# Patient Record
Sex: FEMALE
[De-identification: names, ages, dates, MRNs, and addresses within clinical notes are randomized per-mention and may not be internally consistent; named-entity substitution may affect disease eponyms.]

---

## 2021-11-26 ENCOUNTER — HOSPITAL ENCOUNTER (EMERGENCY)
Dept: HOSPITAL 41 - JD.ED | Age: 51
LOS: 1 days | Discharge: HOME | End: 2021-11-27
Payer: MEDICARE

## 2021-11-26 DIAGNOSIS — Z88.5: ICD-10-CM

## 2021-11-26 DIAGNOSIS — Z88.8: ICD-10-CM

## 2021-11-26 DIAGNOSIS — N83.202: ICD-10-CM

## 2021-11-26 DIAGNOSIS — N83.201: Primary | ICD-10-CM

## 2021-11-26 DIAGNOSIS — R94.31: ICD-10-CM

## 2021-11-26 DIAGNOSIS — Z91.012: ICD-10-CM

## 2021-11-26 NOTE — EDM.PDOC
ED HPI GENERAL MEDICAL PROBLEM





- General


Chief Complaint: Abdominal Pain


Stated Complaint: R SIDE ABDOMINAL PAIN


Time Seen by Provider: 11/26/21 22:13





- History of Present Illness


INITIAL COMMENTS - FREE TEXT/NARRATIVE: 





51-year-old female presents the emergency room with abdominal pain.





Patient has had several days a worsening abdominal pain.  The pain seems to be 

in the right side of her abdomen.  She is quite nauseated minimal vomiting she 

said diarrhea with this.  Yesterday she attempted to go to [place] to have 

Thanksgiving with the family.  Heading down there they stopped and emergency 

room she was tested for Covid found to be negative her evaluation of the 

emergency room was unrevealing I am not entirely sure exactly how big of the 

work-up she had done.  Pain is mostly on the right side.  She had a history of a

cholecystectomy she still has her appendix.  She denies any blood in her stool 

but has had loose stools.


  ** Right Abdomen


Pain Score (Numeric/FACES): 10





- Related Data


                                    Allergies











Allergy/AdvReac Type Severity Reaction Status Date / Time


 


caffeine Allergy Severe Nausea and Verified 11/26/21 22:03





   Vomiting  


 


codeine Allergy Severe Hives Verified 11/26/21 22:03


 


egg Allergy Severe Hives Verified 11/26/21 22:03


 


morphine Allergy Severe Hives Verified 11/26/21 22:03











Home Meds: 


                                    Home Meds





ARIPiprazole [Aripiprazole] 0 mg PO DAILY 11/26/21 [History]


FLUoxetine HCl [Prozac] 40 mg PO DAILY 11/26/21 [History]


Omeprazole 20 mg PO DAILY 11/26/21 [History]


Pregabalin [Lyrica] 0 mg PO DAILY 11/26/21 [History]











ED ROS GENERAL





- Review of Systems


Review Of Systems: See Below


Constitutional: Reports: No Symptoms


HEENT: Reports: No Symptoms


Respiratory: Reports: No Symptoms


Cardiovascular: Reports: No Symptoms


GI/Abdominal: Reports: Abdominal Pain, Anorexia, Diarrhea, Decreased Appetite, 

Nausea, Vomiting.  Denies: Black Stool, Bloody Stool


: Reports: No Symptoms


Musculoskeletal: Reports: No Symptoms


Skin: Reports: No Symptoms


Neurological: Reports: No Symptoms


Psychiatric: Reports: No Symptoms





ED EXAM, GENERAL





- Physical Exam


Exam: See Below


Exam Limited By: No Limitations


General Appearance: Alert, Moderate Distress (From anxiety and pain)


Head: Atraumatic, Normocephalic


Neck: Normal Inspection, Supple, Non-Tender, Full Range of Motion


Respiratory/Chest: No Respiratory Distress, Lungs Clear, Normal Breath Sounds


Cardiovascular: Regular Rate, Rhythm, No Edema, No Murmur


GI/Abdominal: Normal Bowel Sounds, Soft, Tender (Heart tenderness on the right 

side she has rebound tenderness on the left radiating to the right.)


Back Exam: Normal Inspection, CVA Tenderness (L), CVA Tenderness (R)


  ** #1 Interpretation


EKG Date: 11/26/21


Rhythm: Other (Sinus arrhythmia)


Axis: Normal


P-Wave: Present


QRS: Normal


ST-T: Other (Lateral lead ST depression nondiagnostic)


QT: Normal


Comparison: NA - No Prior EKG


EKG Interpretation Comments: 





Abnormal EKG





Course





- Vital Signs


Last Recorded V/S: 


                                Last Vital Signs











Temp  36.4 C   11/26/21 21:56


 


Pulse  52 L  11/26/21 21:56


 


Resp  20   11/26/21 21:56


 


BP  155/79 H  11/26/21 21:56


 


Pulse Ox  100   11/26/21 21:56














- Orders/Labs/Meds


Orders: 


                               Active Orders 24 hr











 Category Date Time Status


 


 Abdomen Pelvis w Cont [CT] Stat Exams  11/26/21 22:23 Taken


 


 Lactated Ringers [Ringers, Lactated] 1,000 ml Med  11/26/21 22:30 Active





 IV ASDIRECTED   








                                Medication Orders





Lactated Ringer's (Ringers, Lactated)  1,000 mls @ 125 mls/hr IV ASDIRECTED LEXI


   Last Admin: 11/26/21 23:47  Dose: 125 mls/hr


   Documented by: HEIDI








Labs: 


                                Laboratory Tests











  11/26/21 11/26/21 11/26/21 Range/Units





  22:37 22:37 22:40 


 


WBC  9.54    (3.98-10.04)  K/mm3


 


RBC  4.42    (3.98-5.22)  M/mm3


 


Hgb  13.2    (11.2-15.7)  gm/dl


 


Hct  40.5    (34.1-44.9)  %


 


MCV  91.6    (79.4-94.8)  fl


 


MCH  29.9    (25.6-32.2)  pg


 


MCHC  32.6    (32.2-35.5)  g/dl


 


RDW Std Deviation  46.1    (36.4-46.3)  fL


 


Plt Count  126 L    (182-369)  K/mm3


 


MPV  10.9    (9.4-12.3)  fl


 


Neutrophils % (Manual)  88 H    (40-60)  %


 


Band Neutrophils %  0    (0-10)  %


 


Lymphocytes % (Manual)  11 L    (20-40)  %


 


Atypical Lymphs %  0    %


 


Monocytes % (Manual)  1 L    (2-10)  %


 


Eosinophils % (Manual)  0 L    (0.7-5.8)  %


 


Basophils % (Manual)  0 L    (0.1-1.2)  


 


Platelet Estimate  Decreased    


 


Plt Morphology Comment  See note    


 


RBC Morph Comment  Normal    


 


Sodium   141   (136-145)  mEq/L


 


Potassium   3.4 L   (3.5-5.1)  mEq/L


 


Chloride   104   ()  mEq/L


 


Carbon Dioxide   24   (21-32)  mEq/L


 


Anion Gap   16.4 H   (5-15)  


 


BUN   11   (7-18)  mg/dL


 


Creatinine   1.0   (0.55-1.02)  mg/dL


 


Est Cr Clr Drug Dosing   53.23   mL/min


 


Estimated GFR (MDRD)   59   (>60)  mL/min


 


BUN/Creatinine Ratio   11.0 L   (14-18)  


 


Glucose   141 H   (70-99)  mg/dL


 


Calcium   9.6   (8.5-10.1)  mg/dL


 


Total Bilirubin   0.4   (0.2-1.0)  mg/dL


 


AST   20   (15-37)  U/L


 


ALT   29   (14-59)  U/L


 


Alkaline Phosphatase   88   ()  U/L


 


Total Protein   7.6   (6.4-8.2)  g/dl


 


Albumin   4.0   (3.4-5.0)  g/dl


 


Globulin   3.6   gm/dL


 


Albumin/Globulin Ratio   1.1   (1-2)  


 


Lipase   111   ()  U/L


 


Urine Color    Yellow  (Yellow)  


 


Urine Appearance    Clear  (Clear)  


 


Urine pH    >=9.0 H  (5.0-8.0)  


 


Ur Specific Gravity    1.020  (1.005-1.030)  


 


Urine Protein    Negative  (Negative)  


 


Urine Glucose (UA)    Negative  (Negative)  


 


Urine Ketones    2+ H  (Negative)  


 


Urine Occult Blood    Negative  (Negative)  


 


Urine Nitrite    Negative  (Negative)  


 


Urine Bilirubin    Negative  (Negative)  


 


Urine Urobilinogen    0.2  (0.2-1.0)  


 


Ur Leukocyte Esterase    Negative  (Negative)  


 


Urine HCG, Qual     (NEGATIVE)  














  11/26/21 Range/Units





  22:40 


 


WBC   (3.98-10.04)  K/mm3


 


RBC   (3.98-5.22)  M/mm3


 


Hgb   (11.2-15.7)  gm/dl


 


Hct   (34.1-44.9)  %


 


MCV   (79.4-94.8)  fl


 


MCH   (25.6-32.2)  pg


 


MCHC   (32.2-35.5)  g/dl


 


RDW Std Deviation   (36.4-46.3)  fL


 


Plt Count   (182-369)  K/mm3


 


MPV   (9.4-12.3)  fl


 


Neutrophils % (Manual)   (40-60)  %


 


Band Neutrophils %   (0-10)  %


 


Lymphocytes % (Manual)   (20-40)  %


 


Atypical Lymphs %   %


 


Monocytes % (Manual)   (2-10)  %


 


Eosinophils % (Manual)   (0.7-5.8)  %


 


Basophils % (Manual)   (0.1-1.2)  


 


Platelet Estimate   


 


Plt Morphology Comment   


 


RBC Morph Comment   


 


Sodium   (136-145)  mEq/L


 


Potassium   (3.5-5.1)  mEq/L


 


Chloride   ()  mEq/L


 


Carbon Dioxide   (21-32)  mEq/L


 


Anion Gap   (5-15)  


 


BUN   (7-18)  mg/dL


 


Creatinine   (0.55-1.02)  mg/dL


 


Est Cr Clr Drug Dosing   mL/min


 


Estimated GFR (MDRD)   (>60)  mL/min


 


BUN/Creatinine Ratio   (14-18)  


 


Glucose   (70-99)  mg/dL


 


Calcium   (8.5-10.1)  mg/dL


 


Total Bilirubin   (0.2-1.0)  mg/dL


 


AST   (15-37)  U/L


 


ALT   (14-59)  U/L


 


Alkaline Phosphatase   ()  U/L


 


Total Protein   (6.4-8.2)  g/dl


 


Albumin   (3.4-5.0)  g/dl


 


Globulin   gm/dL


 


Albumin/Globulin Ratio   (1-2)  


 


Lipase   ()  U/L


 


Urine Color   (Yellow)  


 


Urine Appearance   (Clear)  


 


Urine pH   (5.0-8.0)  


 


Ur Specific Gravity   (1.005-1.030)  


 


Urine Protein   (Negative)  


 


Urine Glucose (UA)   (Negative)  


 


Urine Ketones   (Negative)  


 


Urine Occult Blood   (Negative)  


 


Urine Nitrite   (Negative)  


 


Urine Bilirubin   (Negative)  


 


Urine Urobilinogen   (0.2-1.0)  


 


Ur Leukocyte Esterase   (Negative)  


 


Urine HCG, Qual  Negative  (NEGATIVE)  











Meds: 


Medications











Generic Name Dose Route Start Last Admin





  Trade Name Antonio  PRN Reason Stop Dose Admin


 


Lactated Ringer's  1,000 mls @ 125 mls/hr  11/26/21 22:30  11/26/21 23:47





  Ringers, Lactated  IV   125 mls/hr





  ASDIRECTED LEXI   Administration














Discontinued Medications














Generic Name Dose Route Start Last Admin





  Trade Name Antonio  PRN Reason Stop Dose Admin


 


Diatrizoate Meglum/Diatrizoate Sod  120 ml  11/26/21 23:08  11/27/21 00:09





  Diatrizoate Meglumine/Diatrizoate Sodium 37% 120 Ml Bottle  PO  11/26/21 23:09

 120 ml





  ONETIME ONE   Administration


 


Hydromorphone HCl  0.5 mg  11/26/21 22:21  11/26/21 22:43





  Hydromorphone 0.5 Mg/0.5 Ml Syringe  IVPUSH  11/26/21 22:22  0.5 mg





  ONETIME ONE   Administration


 


Lactated Ringer's  1,000 mls @ 999 mls/hr  11/26/21 22:21  11/26/21 22:43





  Ringers, Lactated  IV  11/26/21 23:21  999 mls/hr





  .BOLUS ONE   Administration


 


Iopamidol  100 ml  11/26/21 23:07  11/27/21 00:09





  Iopamidol 612 Mg/Ml 100 Ml Bottle  IVPUSH  11/26/21 23:08  100 ml





  ONETIME ONE   Administration


 


Ondansetron HCl  4 mg  11/26/21 22:21  11/26/21 22:43





  Ondansetron 4 Mg/2 Ml Sdv  IVPUSH  11/26/21 22:22  4 mg





  ONETIME ONE   Administration


 


Ondansetron HCl  4 mg  11/26/21 23:30  11/26/21 23:38





  Ondansetron 4 Mg/2 Ml Sdv  IVPUSH  11/26/21 23:31  4 mg





  ONETIME ONE   Administration


 


Potassium Chloride  40 meq  11/27/21 02:22 





  Potassium Chloride 20 Meq Tab.Er  PO  11/27/21 02:23 





  ONETIME ONE  


 


Sodium Chloride  10 ml  11/26/21 23:07  11/27/21 00:09





  Sodium Chloride 0.9% 10 Ml Sdv  FLUSH  11/26/21 23:08  10 ml





  ONETIME ONE   Administration














- Re-Assessments/Exams


Free Text/Narrative Re-Assessment/Exam: 





11/27/21 02:32


Laboratory evaluation trying to work-up this abdominal pain is fairly 

nonspecific her potassium is a little low at 3.4 give her 40 mEq of p.o. 

potassium.  Went ahead and ordered a abdomen pelvis CT this was nondiagnostic 

however it was noted that she has bilateral ovarian cysts was recommended that 

these get further evaluated to better understand the etiology via ultrasound as 

an outpatient.





I discussed the above findings with the patient and she agrees to go home and 

will follow up with her OB and her regular healthcare provider in [place].





I will discharge her with some Norco from the machine out in the waiting room 

#20 1 or 2 every 6 hours as needed and Zofran 4 mg 1 every 6 hours #10











Departure





- Departure


Time of Disposition: 02:36


Disposition: Home, Self-Care 01


Clinical Impression: 


 Abdominal pain of unknown cause, Bilateral ovarian cysts








- Discharge Information


Referrals: 


Rosanna Andre, NP [Primary Care Provider] - 


Forms:  ED Department Discharge


Additional Instructions: 


Return to the emergency room with any questions problems or worsening symptoms.





Return in 24 hours if not improving.  Return sooner if getting worse.





On your CAT scan evaluation you have ovarian cyst on both sides it is 

recommended that you get a pelvic ultrasound done to further assess these.  You 

can follow-up with your regular healthcare provider or your OB/GYN for this.





For your pain I given you prescription for hydrocodone you may take 1 or 2 every

6 hours as needed for pain.  Allow 12 hours after using this medication before 

driving or returning to work.





For nausea and vomiting not given you some Zofran you may use 1 every 6 hours as

needed for nausea or vomiting this will dissolve on or under your tongue without

difficulty.





Follow-up with 1 of your healthcare providers early this coming week for follow-

up.





Sepsis Event Note (ED)





- Focused Exam


Vital Signs: 


                                   Vital Signs











  Temp Pulse Resp BP Pulse Ox


 


 11/26/21 21:56  36.4 C  52 L  20  155/79 H  100














- My Orders


Last 24 Hours: 


My Active Orders





11/26/21 22:23


Abdomen Pelvis w Cont [CT] Stat 





11/26/21 22:30


Lactated Ringers [Ringers, Lactated] 1,000 ml IV ASDIRECTED 














- Assessment/Plan


Last 24 Hours: 


My Active Orders





11/26/21 22:23


Abdomen Pelvis w Cont [CT] Stat 





11/26/21 22:30


Lactated Ringers [Ringers, Lactated] 1,000 ml IV ASDIRECTED

## 2021-11-27 NOTE — CT
CT abdomen and pelvis

 

Technique: Multiple axial sections were obtained from above the dome 

of the diaphragm inferiorly through the pubic symphysis.  Intravenous 

and oral contrast was utilized.  Delayed images were obtained through 

the bladder.  Reconstructed coronal and sagittal images were also 

obtained.

 

Comparison: No prior abdominal imaging is available.

 

Findings: Visualized lung bases show nothing acute.

 

Liver contains no focal parenchymal abnormality.  Surgical clips are 

seen from prior cholecystectomy.  Spleen size is normal.  Adrenal 

glands show no nodule.  Pancreas appears within normal limits.  

Kidneys show symmetric contrast enhancement.  No hydronephrosis or 

mass is seen.  Abdominal aorta shows no aneurysm.  No retroperitoneal 

adenopathy or mesenteric abnormalities are seen.  Appendix is seen 

which is normal.  No pelvic mass or adenopathy is noted. 

 

Cyst is noted within the left ovary measuring 2.8 cm.  Cyst is also 

noted within the right ovary measuring 2.7 cm.

 

No free fluid or inflammatory change is seen.  No discrete bowel 

dilatation or bowel wall thickening is noted.  Delayed images show 

contrast within the distal ureters and the bladder.

 

Bone window settings were reviewed which show prior surgery at L5-S1. 

 There is an intrathecal electrostimulating device terminating within 

the lower thoracic spine.

 

Impression:

1.  Spine findings as noted above which are chronic.

2.  Cyst within each ovary which are most likely incidental.

3.  Nothing acute is appreciated on CT study of the abdomen and 

pelvis.

 

Diagnostic code #2

 

I agree with preliminary report from [facility], finalized on 11/27/21, 1:52

 AM CST, code 1

## 2022-03-22 ENCOUNTER — HOSPITAL ENCOUNTER (OUTPATIENT)
Dept: HOSPITAL 41 - JD.SDS | Age: 52
Discharge: HOME | End: 2022-03-22
Attending: OBSTETRICS & GYNECOLOGY
Payer: MEDICARE

## 2022-03-22 DIAGNOSIS — N83.8: ICD-10-CM

## 2022-03-22 DIAGNOSIS — F41.0: ICD-10-CM

## 2022-03-22 DIAGNOSIS — Z79.899: ICD-10-CM

## 2022-03-22 DIAGNOSIS — Z88.6: ICD-10-CM

## 2022-03-22 DIAGNOSIS — Z98.890: ICD-10-CM

## 2022-03-22 DIAGNOSIS — Z90.49: ICD-10-CM

## 2022-03-22 DIAGNOSIS — F32.A: ICD-10-CM

## 2022-03-22 DIAGNOSIS — Z87.891: ICD-10-CM

## 2022-03-22 DIAGNOSIS — D27.0: ICD-10-CM

## 2022-03-22 DIAGNOSIS — Z91.012: ICD-10-CM

## 2022-03-22 DIAGNOSIS — N80.0: Primary | ICD-10-CM

## 2022-03-22 DIAGNOSIS — K21.9: ICD-10-CM

## 2022-03-22 DIAGNOSIS — Z88.5: ICD-10-CM

## 2022-03-22 DIAGNOSIS — Z86.73: ICD-10-CM

## 2022-03-22 DIAGNOSIS — Z98.51: ICD-10-CM

## 2022-03-22 DIAGNOSIS — Z91.018: ICD-10-CM

## 2022-03-22 DIAGNOSIS — H54.7: ICD-10-CM

## 2022-03-22 DIAGNOSIS — F43.10: ICD-10-CM

## 2022-03-22 PROCEDURE — 58552 LAPARO-VAG HYST INCL T/O: CPT

## 2022-03-22 PROCEDURE — 86900 BLOOD TYPING SEROLOGIC ABO: CPT

## 2022-03-22 PROCEDURE — 86850 RBC ANTIBODY SCREEN: CPT

## 2022-03-22 PROCEDURE — 85025 COMPLETE CBC W/AUTO DIFF WBC: CPT

## 2022-03-22 PROCEDURE — 80048 BASIC METABOLIC PNL TOTAL CA: CPT

## 2022-03-22 PROCEDURE — 36415 COLL VENOUS BLD VENIPUNCTURE: CPT

## 2022-03-22 PROCEDURE — 86901 BLOOD TYPING SEROLOGIC RH(D): CPT

## 2022-03-22 RX ADMIN — LIDOCAINE HYDROCHLORIDE AND EPINEPHRINE ONE ML: 10; 10 INJECTION, SOLUTION INFILTRATION; PERINEURAL at 08:32

## 2022-03-22 RX ADMIN — LIDOCAINE HYDROCHLORIDE AND EPINEPHRINE ONE ML: 10; 10 INJECTION, SOLUTION INFILTRATION; PERINEURAL at 10:58

## 2022-06-29 ENCOUNTER — HOSPITAL ENCOUNTER (EMERGENCY)
Dept: HOSPITAL 41 - JD.ED | Age: 52
Discharge: HOME | End: 2022-06-29
Payer: MEDICARE

## 2022-06-29 DIAGNOSIS — Z91.048: ICD-10-CM

## 2022-06-29 DIAGNOSIS — R07.89: Primary | ICD-10-CM

## 2022-06-29 DIAGNOSIS — Z88.5: ICD-10-CM

## 2022-06-29 DIAGNOSIS — K21.9: ICD-10-CM

## 2022-06-29 LAB — EGFRCR SERPLBLD CKD-EPI 2021: 77 ML/MIN (ref 60–?)

## 2022-06-29 PROCEDURE — 84443 ASSAY THYROID STIM HORMONE: CPT

## 2022-06-29 PROCEDURE — 96375 TX/PRO/DX INJ NEW DRUG ADDON: CPT

## 2022-06-29 PROCEDURE — 71045 X-RAY EXAM CHEST 1 VIEW: CPT

## 2022-06-29 PROCEDURE — 85379 FIBRIN DEGRADATION QUANT: CPT

## 2022-06-29 PROCEDURE — 81001 URINALYSIS AUTO W/SCOPE: CPT

## 2022-06-29 PROCEDURE — 93005 ELECTROCARDIOGRAM TRACING: CPT

## 2022-06-29 PROCEDURE — 36415 COLL VENOUS BLD VENIPUNCTURE: CPT

## 2022-06-29 PROCEDURE — 99285 EMERGENCY DEPT VISIT HI MDM: CPT

## 2022-06-29 PROCEDURE — 84484 ASSAY OF TROPONIN QUANT: CPT

## 2022-06-29 PROCEDURE — 80053 COMPREHEN METABOLIC PANEL: CPT

## 2022-06-29 PROCEDURE — 85025 COMPLETE CBC W/AUTO DIFF WBC: CPT

## 2022-06-29 PROCEDURE — 71275 CT ANGIOGRAPHY CHEST: CPT

## 2022-06-29 PROCEDURE — 96374 THER/PROPH/DIAG INJ IV PUSH: CPT

## 2022-06-29 PROCEDURE — 87086 URINE CULTURE/COLONY COUNT: CPT

## 2022-06-29 PROCEDURE — 83735 ASSAY OF MAGNESIUM: CPT

## 2022-06-29 PROCEDURE — 86140 C-REACTIVE PROTEIN: CPT
